# Patient Record
Sex: FEMALE | Race: WHITE
[De-identification: names, ages, dates, MRNs, and addresses within clinical notes are randomized per-mention and may not be internally consistent; named-entity substitution may affect disease eponyms.]

---

## 2017-08-01 ENCOUNTER — HOSPITAL ENCOUNTER (OUTPATIENT)
Dept: HOSPITAL 47 - WWCWWP | Age: 42
Discharge: HOME | End: 2017-08-01
Payer: COMMERCIAL

## 2017-08-01 DIAGNOSIS — R92.8: Primary | ICD-10-CM

## 2017-08-02 NOTE — [facility]
DATE OF SERVICE: 17



CHIEF COMPLAINT: The patient is here for her routine gynecological exam and 
mammogram. 



HISTORY OF PRESENT ILLNESS: This is a 41-year-old G4, P3, 0, 1, 3 with an LMP 
of 07/10/17.  Her  is status post vasectomy.  She did have a previous 
abnormal mammogram in  which did require a breast biopsy which revealed a 
fibroadenoma.  She also had fibrocystic changes. She states she has not had any 
mammograms done since then.  It has been about 4 years since her last pelvic 
exam.  She states her periods are regular every month.   The patient has 
noticed a small lump of fatty tissue at the lateral aspect of the right breast 
and she says she has noticed this since about May.   She denies any pain or 
redness.  



PAST MEDICAL HISTORY:   Anxiety.  



Medications:   Celexa 25 mg daily.



ALLERGIES:  No known drug allergies. 



PAST SURGICAL HISTORY:  Laparoscopic cholecystectomy in .  



PAST OB HISTORY:  Three vaginal deliveries and 1 spontaneous . 



PAST GYN:  She has no history of STDs. 



SOCIAL HISTORY: She denies tobacco and drug use and has 0 to 2 alcohol drinks 
per month. She has been  since  and works at the [employer] in the [employer] department.   



FAMILY HISTORY: Grandmother had breast cancer.  Grandmother also had CHF. 



REVIEW OF SYSTEMS:  She has lost 18-20 pounds during the last six months with 
diet and exercise.  This was lost during a contest with coworkers for weight 
loss.  She states she has been able to keep the weight off.  She denies 
respiratory, cardiac or GI problems.  

PHYSICAL EXAM:  Blood pressure 121/78.   Height 5 feet 4 inches, weight 154 
pounds.  Temperature 96.5.  Pulse 81.  This is a well developed, well nourished 
white female who is alert and oriented times three in no acute distress.  HEENT
: is within normal limits.   Neck is supple without mass or thyromegaly.   
Chest and lungs clear to auscultation. Heart is regular rate and rhythm.  
Breasts:  without mass or discharge.  The area where she has felt some soft 
tissue at the lateral aspect of the breast does not reveal any abnormality to 
inspection or palpitation.  It is possible there is a very small lipoma in this 
area that she may be noticing but examination is within normal limits.        
Axillary exam is negative for adenopathy.  Back negative for CVA tenderness.   
Abdomen is soft and nontender without palpable masses.  Pelvic exam, normal 
external genitalia.   Cervix appears multiparous and normal.  Vagina appears 
normal.  .  There is no evidence of prolapse.  The uterus is mid position, 
nongravid size and nontender. There are no palpable adnexal masses or 
tenderness.   Rectal exam was refused by the patient.  Extremities nontender. 



IMPRESSION:  

1.   A 41 year old female with normal gynecological exam whose  is 
status post vasectomy.

2.   History of abnormal mammogram in  which did require a right breast 
biopsy which showed fibroadenoma and fibrocystic changes. 

3.   The patient has noticed soft tissue area at the lateral aspect of the 
right breast without any significant physical findings at this time.   
Differential diagnosis will include lipoma or possible breast and fatty tissue 
changes following intentional weight loss since she has lost about 20 pounds 
over the last six months. 



PLAN: 

1.   Pap smear was performed. 

2.   Self breast examination was discussed. 

3.   Mammogram will be done today.  This will be  diagnostic mammogram because 
of her history of abnormal mammogram and biopsy two years ago. 

4.   If the breast imaging studies are benign, I have reassured the patient 
that her findings are quite normal and most likely it is related to the weight 
loss.   She was instructed to call if she has any changes on breast exam and we 
can reevaluate the breasts at that time.

5.   She will return in one year. 
ZACK

## 2017-08-02 NOTE — MM
Reason for exam: additional evaluation requested from prior study.

Last mammogram was performed 2 years and 2 months ago.



History:

Family history of breast cancer in paternal grandmother at age 40.

Benign US biopsy breast VAD LT of the left breast, May 26, 2015.  Benign US RT VAD

breast biopsy of the right breast, October 8, 2013.

Took hormonal contraceptives for 2 years.



Physical Findings:

Nurse did not find any significant physical abnormalities on exam.



MG 3D Diag Mammo W/Cad JOSE

Bilateral CC and MLO view(s) were taken.

Prior study comparison: May 26, 2015, left breast MG diagnostic mammo LT wo CAD.  

October 3, 2013, CAD bilateral diagnostic mammogram.

The breast tissue is heterogeneously dense. This may lower the sensitivity of 

mammography.  Previous mammotome biopsy within the left breast.

No significant new findings when compared with previous films.



These results were verbally communicated with the patient and result sheet given 

to the patient on 08/01/17.





ASSESSMENT: Benign, BI-RAD 2



RECOMMENDATION:

Routine screening mammogram of both breasts in 1 year.

## 2019-01-09 ENCOUNTER — HOSPITAL ENCOUNTER (OUTPATIENT)
Dept: HOSPITAL 47 - WWCWWP | Age: 44
End: 2019-01-09
Payer: COMMERCIAL

## 2019-01-09 VITALS
TEMPERATURE: 97.7 F | RESPIRATION RATE: 18 BRPM | HEART RATE: 80 BPM | DIASTOLIC BLOOD PRESSURE: 79 MMHG | SYSTOLIC BLOOD PRESSURE: 119 MMHG

## 2019-01-09 VITALS — BODY MASS INDEX: 26.9 KG/M2

## 2019-01-09 DIAGNOSIS — Z53.9: Primary | ICD-10-CM

## 2019-01-09 NOTE — P.HPOB
History of Present Illness


H&P Date: 19


Chief Complaint: The patient is here for her routine gynecologic exam.


This is a 43-year-old  with an LMP that was not specified.  She states 

her menses are regular every month.  Her 's status post vasectomy.








Review of Systems


The patient has gained 3 pounds over the last year.  This was after losing 20 

pounds the prior year with diet and exercise.  She denies respiratory, cardiac, 

or G.I. problems.








Past Medical History


Past Medical History: No Reported History


Additional Past Medical History / Comment(s): PAST GYN HISTORY: She has no 

history of STDs.


History of Any Multi-Drug Resistant Organisms: None Reported


Past Surgical History: Breast Surgery (Breast biopsy 2016), Cholecystectomy


Past Anesthesia/Blood Transfusion Reactions: No Reported Reaction


Past Psychological History: Anxiety


Smoking Status: Never smoker


Past Alcohol Use History: Rare (0-2 per month)


Past Drug Use History: None Reported


Additional History: She has been  since  and works for the [employer] in the [employer] department.





- Past Family History


  ** Father


Additional Family Medical History / Comment(s): Paternal grandmother had breast 

cancer.





  ** Mother


Additional Family Medical History / Comment(s): Maternal grandmother had CHF.





Medications and Allergies


 Home Medications











 Medication  Instructions  Recorded  Confirmed  Type


 


Ibuprofen [Advil] 200 mg PO Q6HR PRN 19 History











 Allergies











Allergy/AdvReac Type Severity Reaction Status Date / Time


 


No Known Allergies Allergy   Verified 16 10:12














Exam


 Vital Signs











  Temp Pulse Resp BP Pulse Ox


 


 19 11:36  97.7 F  80  18  119/79  99








 Intake and Output











 19





 22:59 06:59 14:59


 


Other:   


 


  Weight   71.214 kg











Height 5'4", weight 157 pounds, BMI 26.9.





This is a well-developed well-nourished white female who is alert and oriented 

times 3 in no acute distress.


HEENT: Within normal limits.


NECK: Supple without mass or thyromegaly.


CHEST AND LUNGS: Clear to auscultation.


HEART: Regular rate and rhythm.


BREASTS: Are without mass or discharge.


AXILLARY EXAM: Negative for adenopathy.


BACK: Negative for CVA tenderness.


ABDOMEN: Soft, nontender, without palpable masses.


PELVIC EXAM: Normal external genitalia. Cervix and vagina appear normal.  The 

cervix is multiparous.  There is no unusual discharge.  There is no evidence of 

prolapse.  The uterus is midposition, nongravid size and nontender. There are 

no palpable adnexal masses or tenderness.


RECTAL EXAM: negative for mass or tenderness and is negative for occult blood.


EXTREMITIES: Nontender.





IMPRESSION: 


1.  43-year-old gynecologically healthy female whose  is status post 

vasectomy.





PLAN: 


1.  Pap smear was deferred sentence she had a normal one less than 2 years ago.


2.  Self breast awareness was discussed with the patient.


3.  Screening mammogram is scheduled for 2019.  The orders that was given 

to the patient for this.


4.  Osteoporosis prevention was discussed.  I have stressed the importance of 

adequate calcium, vitamin D and regular exercise.  Recommended amounts of 

calcium and vitamin D were also discussed.


5.  He will return in one year.

## 2019-02-07 ENCOUNTER — HOSPITAL ENCOUNTER (OUTPATIENT)
Dept: HOSPITAL 47 - RADMAMWWP | Age: 44
Discharge: HOME | End: 2019-02-07
Payer: COMMERCIAL

## 2019-02-07 DIAGNOSIS — Z12.31: Primary | ICD-10-CM

## 2019-02-07 PROCEDURE — 77063 BREAST TOMOSYNTHESIS BI: CPT

## 2019-02-07 PROCEDURE — 77067 SCR MAMMO BI INCL CAD: CPT

## 2019-02-11 NOTE — MM
Reason for exam: screening  (asymptomatic).

Last mammogram was performed 1 year and 6 months ago.



History:

Family history of breast cancer in paternal grandmother at age 40.

Benign US biopsy breast VAD LT of the left breast, May 26, 2015.  Benign US RT VAD

breast biopsy of the right breast, October 8, 2013.

Took hormonal contraceptives for 2 years.



Physical Findings:

A clinical breast exam by your physician is recommended on an annual basis and 

results should be correlated with mammographic findings.



MG 3D Screening Mammo W/Cad

Bilateral CC and MLO view(s) were taken.

Prior study comparison: August 1, 2017, bilateral MG 3d diag mammo w/cad JOSE.  May

26, 2015, left breast MG diagnostic mammo LT wo CAD.

The breast tissue is heterogeneously dense. This may lower the sensitivity of 

mammography.  There are benign appearing round calcifications bilaterally. 

Previous mammotome biopsy in the left breast. Focal asymmetry left marked 

posterior medial aspect CC  (15/73).

This finding is changed when compared with previous exams.





ASSESSMENT: Incomplete: need additional imaging evaluation, BI-RAD 0



RECOMMENDATION:

Ultrasound of the left breast.



[facility] will attempt to contact patient  to return for ultrasound.

## 2019-02-19 ENCOUNTER — HOSPITAL ENCOUNTER (OUTPATIENT)
Dept: HOSPITAL 47 - RADUSWWP | Age: 44
Discharge: HOME | End: 2019-02-19
Payer: COMMERCIAL

## 2019-02-19 DIAGNOSIS — R92.8: Primary | ICD-10-CM

## 2019-02-19 PROCEDURE — 77061 BREAST TOMOSYNTHESIS UNI: CPT

## 2019-02-19 PROCEDURE — 77065 DX MAMMO INCL CAD UNI: CPT

## 2019-02-19 NOTE — USB
Reason for exam: additional evaluation requested from abnormal screening.



History:

Family history of breast cancer in paternal grandmother at age 40.

Benign US biopsy breast VAD LT of the left breast, May 26, 2015.  Benign US RT VAD

breast biopsy of the right breast, October 8, 2013.

Took hormonal contraceptives for 2 years.



Physical Findings:

Nurse did not find any significant physical abnormalities on exam.



US Breast Workup Limited LT

Left limited breast ultrasound including focal area of concern, retroareolar and 

axilla demonstrates a 0.5 x 0.5 x 0.4cm mixed lesion at 9 o'clock, a 1.2 x 0.8 x 

1.0cm lobular, solid lesion at 1 o'clock with clip visualized, a 0.6 x 0.3 x 0.5cm

solid, hypoechoic lesion at 1 o'clock and a 0.3 x 0.3 x 0.2cm lesion at 10 

o'clock, corresponds to the mammographic finding, cystic with a well defined 

posterior wall.



These results were verbally communicated with the patient and result sheet given 

to the patient on 2/19/19.





ASSESSMENT: Probably benign, BI-RAD 3



RECOMMENDATION:

Follow-up diagnostic mammogram and ultrasound of the left breast in 6 months.

## 2019-02-19 NOTE — MM
Reason for exam: additional evaluation requested from abnormal screening.

Last mammogram was performed less than 1 month ago.



History:

Family history of breast cancer in paternal grandmother at age 40.

Benign US biopsy breast VAD LT of the left breast, May 26, 2015.  Benign US RT VAD

breast biopsy of the right breast, October 8, 2013.

Took hormonal contraceptives for 2 years.



Physical Findings:

Nurse did not find any significant physical abnormalities on exam.



MG 3D Work Up W/Cad LT

CC and MLO view(s) were taken of the left breast.

Prior study comparison: February 7, 2019, bilateral MG 3d screening mammo w/cad.  

August 1, 2017, bilateral MG 3d diag mammo w/cad JOSE.

The breast tissue is heterogeneously dense. This may lower the sensitivity of 

mammography.  There is a 3mm mass 6cm from nipple in the left upper inner quadrant

persists on additional views, corresponds to a cyst with posterior defined wall 

in dense tissue on ultrasound.



These results were verbally communicated with the patient and result sheet given 

to the patient on 2/19/19.





ASSESSMENT: Incomplete: need additional imaging evaluation, BI-RAD 0



RECOMMENDATION:

Ultrasound of the left breast.

(upper outer quadrant)